# Patient Record
Sex: MALE | Race: WHITE | NOT HISPANIC OR LATINO | URBAN - METROPOLITAN AREA
[De-identification: names, ages, dates, MRNs, and addresses within clinical notes are randomized per-mention and may not be internally consistent; named-entity substitution may affect disease eponyms.]

---

## 2017-08-11 DIAGNOSIS — R79.89 LOW TESTOSTERONE: ICD-10-CM

## 2017-08-11 NOTE — TELEPHONE ENCOUNTER
syringe   Last Written Prescription Date:  7/6/16  Last Fill Quantity: 12,   # refills: 3  Last Office Visit : 5/20/16  Future Office visit:  No future appt    Routing refill request to provider for review/approval because:  Last appt > 1 yr ago

## 2017-11-08 DIAGNOSIS — R79.89 LOW TESTOSTERONE: ICD-10-CM

## 2018-05-15 DIAGNOSIS — R79.89 LOW TESTOSTERONE: ICD-10-CM

## 2018-05-15 NOTE — TELEPHONE ENCOUNTER
Patient f/u needed. Need for syringes?    Patient requesting refill of his syringe/needle Rx and last despensed to patient by the pharmacy on 5/6/18 to use with his testosterone injections.    I called the pharmacy to inquire who has been filling his testoserone medication.  Confirmed with pharmacy- last Rx for testoserone ordered by Dr Hadley was 9/20/16 is also what they have on file.  No other more recent script and they have not been dispensing this medication to patient recently.       FYI- patient last seen in clinic 5/20/2016 and no future appointment.  Message was sent to .

## 2018-05-22 ENCOUNTER — DOCUMENTATION ONLY (OUTPATIENT)
Dept: ENDOCRINOLOGY | Facility: CLINIC | Age: 38
End: 2018-05-22

## 2018-05-22 NOTE — PROGRESS NOTES
Patient's phone does not accept incoming calls. Multiple attempts to contact to schedule appointment that is needed for further refills. Letter sent.

## 2018-10-24 DIAGNOSIS — R79.89 LOW TESTOSTERONE: ICD-10-CM

## 2018-11-13 DIAGNOSIS — R79.89 LOW TESTOSTERONE: ICD-10-CM

## 2019-09-29 ENCOUNTER — HEALTH MAINTENANCE LETTER (OUTPATIENT)
Age: 39
End: 2019-09-29

## 2021-01-14 ENCOUNTER — HEALTH MAINTENANCE LETTER (OUTPATIENT)
Age: 41
End: 2021-01-14

## 2021-05-30 ENCOUNTER — RECORDS - HEALTHEAST (OUTPATIENT)
Dept: ADMINISTRATIVE | Facility: CLINIC | Age: 41
End: 2021-05-30

## 2021-10-24 ENCOUNTER — HEALTH MAINTENANCE LETTER (OUTPATIENT)
Age: 41
End: 2021-10-24

## 2022-02-13 ENCOUNTER — HEALTH MAINTENANCE LETTER (OUTPATIENT)
Age: 42
End: 2022-02-13

## 2022-10-15 ENCOUNTER — HEALTH MAINTENANCE LETTER (OUTPATIENT)
Age: 42
End: 2022-10-15

## 2023-03-26 ENCOUNTER — HEALTH MAINTENANCE LETTER (OUTPATIENT)
Age: 43
End: 2023-03-26